# Patient Record
Sex: FEMALE | URBAN - METROPOLITAN AREA
[De-identification: names, ages, dates, MRNs, and addresses within clinical notes are randomized per-mention and may not be internally consistent; named-entity substitution may affect disease eponyms.]

---

## 2022-01-25 ENCOUNTER — OFFICE VISIT (OUTPATIENT)
Dept: URBAN - METROPOLITAN AREA CLINIC 23 | Facility: CLINIC | Age: 67
End: 2022-01-25
Payer: MEDICARE

## 2022-01-25 DIAGNOSIS — H43.811 VITREOUS DEGENERATION, RIGHT EYE: ICD-10-CM

## 2022-01-25 DIAGNOSIS — H25.813 COMBINED FORMS OF AGE-RELATED CATARACT, BILATERAL: Primary | ICD-10-CM

## 2022-01-25 PROCEDURE — 99203 OFFICE O/P NEW LOW 30 MIN: CPT | Performed by: OPTOMETRIST

## 2022-01-25 ASSESSMENT — VISUAL ACUITY
OD: 20/25
OS: 20/30

## 2022-01-25 ASSESSMENT — INTRAOCULAR PRESSURE
OS: 14
OD: 14

## 2022-01-25 ASSESSMENT — KERATOMETRY
OS: 45.88
OD: 45.88

## 2022-01-25 NOTE — IMPRESSION/PLAN
Impression: Combined forms of age-related cataract, bilateral: H25.813. Plan: Discussed diagnosis in detail with patient. Cataracts affecting vision some, but no surgery is currently recommended. The patient will monitor vision changes and contact us with any decrease in vision. Continue to monitor. 

RTC 3-4 week for refraction

## 2023-05-23 ENCOUNTER — OFFICE VISIT (OUTPATIENT)
Dept: URBAN - METROPOLITAN AREA CLINIC 23 | Facility: CLINIC | Age: 68
End: 2023-05-23
Payer: MEDICARE

## 2023-05-23 DIAGNOSIS — H25.813 COMBINED FORMS OF AGE-RELATED CATARACT, BILATERAL: Primary | ICD-10-CM

## 2023-05-23 DIAGNOSIS — H43.811 VITREOUS DEGENERATION, RIGHT EYE: ICD-10-CM

## 2023-05-23 PROCEDURE — 99214 OFFICE O/P EST MOD 30 MIN: CPT | Performed by: OPTOMETRIST

## 2023-05-23 ASSESSMENT — KERATOMETRY
OS: 45.75
OD: 45.63

## 2023-05-23 ASSESSMENT — VISUAL ACUITY
OD: 20/30
OS: 20/30

## 2023-05-23 ASSESSMENT — INTRAOCULAR PRESSURE
OD: 20
OS: 18

## 2023-05-23 NOTE — IMPRESSION/PLAN
Impression: Vitreous degeneration, right eye: H43.811. Plan: Pt edu on all findings. All signs and risks of retinal detachment and tears were discussed in detail. Patient instructed to call the office immediately with any symptoms noted. RTC PRN DFE with [employer].

## 2023-05-31 ENCOUNTER — OFFICE VISIT (OUTPATIENT)
Dept: URBAN - METROPOLITAN AREA CLINIC 23 | Facility: CLINIC | Age: 68
End: 2023-05-31
Payer: MEDICARE

## 2023-05-31 DIAGNOSIS — H25.812 COMBINED FORMS OF AGE-RELATED CATARACT, LEFT EYE: ICD-10-CM

## 2023-05-31 DIAGNOSIS — H25.11 AGE-RELATED NUCLEAR CATARACT, RIGHT EYE: Primary | ICD-10-CM

## 2023-05-31 PROCEDURE — 99204 OFFICE O/P NEW MOD 45 MIN: CPT | Performed by: OPHTHALMOLOGY

## 2023-05-31 RX ORDER — PREDNISOLONE ACETATE 10 MG/ML
1 % SUSPENSION/ DROPS OPHTHALMIC
Qty: 10 | Refills: 1 | Status: ACTIVE
Start: 2023-05-31

## 2023-05-31 RX ORDER — OFLOXACIN 3 MG/ML
0.3 % SOLUTION/ DROPS OPHTHALMIC
Qty: 5 | Refills: 1 | Status: ACTIVE
Start: 2023-05-31

## 2023-05-31 ASSESSMENT — INTRAOCULAR PRESSURE
OD: 17
OS: 14

## 2023-05-31 ASSESSMENT — VISUAL ACUITY
OS: 20/30
OD: 20/30

## 2023-05-31 NOTE — IMPRESSION/PLAN
Impression: Age-related nuclear cataract, right eye: H25.11. Condition: established, worsening. Symptoms: could improve with surgery. Plan: Cataract accounts for patient's complaints. Reviewed risks, benefits, and procedure. Patient desires surgery, schedule ce/iol OD then OS, RL2, discussed lens options, distance refractive target, patient is clear for surgery in [facility].

## 2023-06-06 ENCOUNTER — PRE-OPERATIVE VISIT (OUTPATIENT)
Dept: URBAN - METROPOLITAN AREA CLINIC 23 | Facility: CLINIC | Age: 68
End: 2023-06-06
Payer: MEDICARE

## 2023-06-06 DIAGNOSIS — H25.813 COMBINED FORMS OF AGE-RELATED CATARACT, BILATERAL: Primary | ICD-10-CM

## 2023-06-06 ASSESSMENT — PACHYMETRY
OD: 3.12
OS: 3.33
OD: 23.48
OS: 23.74

## 2023-06-21 ENCOUNTER — SURGERY (OUTPATIENT)
Dept: URBAN - METROPOLITAN AREA SURGERY 11 | Facility: SURGERY | Age: 68
End: 2023-06-21
Payer: MEDICARE

## 2023-06-21 PROCEDURE — 66984 XCAPSL CTRC RMVL W/O ECP: CPT | Performed by: OPHTHALMOLOGY

## 2023-06-22 ENCOUNTER — POST-OPERATIVE VISIT (OUTPATIENT)
Dept: URBAN - METROPOLITAN AREA CLINIC 23 | Facility: CLINIC | Age: 68
End: 2023-06-22
Payer: MEDICARE

## 2023-06-22 DIAGNOSIS — Z48.810 ENCOUNTER FOR SURGICAL AFTERCARE FOLLOWING SURGERY ON A SENSE ORGAN: Primary | ICD-10-CM

## 2023-06-22 PROCEDURE — 99024 POSTOP FOLLOW-UP VISIT: CPT | Performed by: OPTOMETRIST

## 2023-06-22 ASSESSMENT — INTRAOCULAR PRESSURE
OS: 16
OD: 20

## 2023-06-22 NOTE — IMPRESSION/PLAN
Impression: S/P Cataract Extraction by phacoemulsification with IOL placement OD - 1 Day. Encounter for surgical aftercare following surgery on a sense organ  Z48.810. Plan: Pt edu. Appropriate appearance and IOP. Use AT's PRN OU for comfort. Advised patient to call with any issues. RTC as scheduled.

## 2023-06-29 ENCOUNTER — POST-OPERATIVE VISIT (OUTPATIENT)
Dept: URBAN - METROPOLITAN AREA CLINIC 23 | Facility: CLINIC | Age: 68
End: 2023-06-29
Payer: MEDICARE

## 2023-06-29 DIAGNOSIS — Z48.810 ENCOUNTER FOR SURGICAL AFTERCARE FOLLOWING SURGERY ON A SENSE ORGAN: Primary | ICD-10-CM

## 2023-06-29 PROCEDURE — 99024 POSTOP FOLLOW-UP VISIT: CPT | Performed by: OPTOMETRIST

## 2023-06-29 ASSESSMENT — INTRAOCULAR PRESSURE
OS: 14
OD: 13

## 2023-06-29 ASSESSMENT — VISUAL ACUITY: OD: 20/20

## 2023-06-29 NOTE — IMPRESSION/PLAN
Impression: S/P Cataract Extraction by phacoemulsification with IOL placement OD - 8 Days. Encounter for surgical aftercare following surgery on a sense organ  Z48.810. Plan: Pt edu. Appropriate appearance and IOP. Continue Pred Acetate as prescribed. Use AT's PRN OU for comfort. Advised patient to call with any issues. Okay to proceed with 2nd eye.

## 2023-07-19 ENCOUNTER — SURGERY (OUTPATIENT)
Dept: URBAN - METROPOLITAN AREA SURGERY 11 | Facility: SURGERY | Age: 68
End: 2023-07-19
Payer: MEDICARE

## 2023-07-19 PROCEDURE — 66984 XCAPSL CTRC RMVL W/O ECP: CPT | Performed by: OPHTHALMOLOGY

## 2023-07-20 ENCOUNTER — POST-OPERATIVE VISIT (OUTPATIENT)
Dept: URBAN - METROPOLITAN AREA CLINIC 23 | Facility: CLINIC | Age: 68
End: 2023-07-20
Payer: MEDICARE

## 2023-07-20 DIAGNOSIS — Z48.810 ENCOUNTER FOR SURGICAL AFTERCARE FOLLOWING SURGERY ON A SENSE ORGAN: Primary | ICD-10-CM

## 2023-07-20 PROCEDURE — 99024 POSTOP FOLLOW-UP VISIT: CPT | Performed by: OPTOMETRIST

## 2023-07-20 ASSESSMENT — INTRAOCULAR PRESSURE
OD: 13
OS: 24

## 2023-07-20 NOTE — IMPRESSION/PLAN
Impression: S/P Cataract Extraction by phacoemulsification with IOL placement OS - 1 Day. Encounter for surgical aftercare following surgery on a sense organ  Z48.810. Plan: Pt edu. Appropriate appearance and IOP. Reviewed PO instructions with patient. Start Pred Acetate and Ofloxacin OS as prescribed. Use AT's PRN OU for comfort. Advised patient to call with any issues. RTC as scheduled.

## 2023-07-26 ENCOUNTER — POST-OPERATIVE VISIT (OUTPATIENT)
Dept: URBAN - METROPOLITAN AREA CLINIC 23 | Facility: CLINIC | Age: 68
End: 2023-07-26
Payer: MEDICARE

## 2023-07-26 DIAGNOSIS — Z96.1 PRESENCE OF INTRAOCULAR LENS: Primary | ICD-10-CM

## 2023-07-26 PROCEDURE — 99024 POSTOP FOLLOW-UP VISIT: CPT | Performed by: OPTOMETRIST

## 2023-07-26 ASSESSMENT — INTRAOCULAR PRESSURE
OD: 16
OS: 16

## 2023-08-16 ENCOUNTER — POST-OPERATIVE VISIT (OUTPATIENT)
Dept: URBAN - METROPOLITAN AREA CLINIC 23 | Facility: CLINIC | Age: 68
End: 2023-08-16
Payer: MEDICARE

## 2023-08-16 DIAGNOSIS — Z96.1 PRESENCE OF INTRAOCULAR LENS: Primary | ICD-10-CM

## 2023-08-16 PROCEDURE — 99024 POSTOP FOLLOW-UP VISIT: CPT | Performed by: OPTOMETRIST

## 2023-08-16 ASSESSMENT — INTRAOCULAR PRESSURE
OD: 15
OS: 15

## 2023-08-16 ASSESSMENT — KERATOMETRY
OD: 45.75
OS: 44.25